# Patient Record
Sex: FEMALE | Race: WHITE | NOT HISPANIC OR LATINO | Employment: OTHER
[De-identification: names, ages, dates, MRNs, and addresses within clinical notes are randomized per-mention and may not be internally consistent; named-entity substitution may affect disease eponyms.]

---

## 2017-09-10 ENCOUNTER — HEALTH MAINTENANCE LETTER (OUTPATIENT)
Age: 62
End: 2017-09-10

## 2018-02-27 DIAGNOSIS — R06.09 DYSPNEA ON EXERTION: Primary | ICD-10-CM

## 2018-02-27 DIAGNOSIS — I27.20 PULMONARY HYPERTENSION (H): ICD-10-CM

## 2018-03-02 RX ORDER — SILDENAFIL CITRATE 20 MG/1
TABLET ORAL
Qty: 90 TABLET | Refills: 0 | Status: SHIPPED | OUTPATIENT
Start: 2018-03-02

## 2018-03-02 NOTE — TELEPHONE ENCOUNTER
A 30 day refill for Revatio (sildenafil) has been sent to Pharmacy.  No further refills will be sent until patient follows up with Dr. Adams.  Patient will also need to make an appointment to complete a 6 muinute walk test and Echocardiogram prior to appointment.

## 2018-04-26 ENCOUNTER — TELEPHONE (OUTPATIENT)
Dept: CARDIOLOGY | Facility: CLINIC | Age: 63
End: 2018-04-26

## 2018-04-26 NOTE — TELEPHONE ENCOUNTER
Contacted pt regarding need to schedule follow-up appointment with Dr. Adams as she was last seen in 07/2016. Pt states that she wishes to discontinue her care with Dr. Adams and will be now following with a local cardiologist in the [place]. Pt verbalized understanding that further refills will not be provided from Dr. Adams without follow-up.     [facility]   Pulmonary Hypertension  [facility]  (P) [phone]

## 2018-06-20 ENCOUNTER — TRANSFERRED RECORDS (OUTPATIENT)
Dept: HEALTH INFORMATION MANAGEMENT | Facility: CLINIC | Age: 63
End: 2018-06-20

## 2018-07-30 RX ORDER — CIPROFLOXACIN 2 MG/ML
400 INJECTION, SOLUTION INTRAVENOUS
Status: CANCELLED | OUTPATIENT
Start: 2018-08-03

## 2018-08-03 ENCOUNTER — SURGERY (OUTPATIENT)
Age: 63
End: 2018-08-03

## 2018-08-03 ENCOUNTER — ANESTHESIA (OUTPATIENT)
Dept: SURGERY | Facility: CLINIC | Age: 63
End: 2018-08-03
Payer: COMMERCIAL

## 2018-08-03 ENCOUNTER — APPOINTMENT (OUTPATIENT)
Dept: GENERAL RADIOLOGY | Facility: CLINIC | Age: 63
End: 2018-08-03
Attending: UROLOGY
Payer: COMMERCIAL

## 2018-08-03 ENCOUNTER — ANESTHESIA EVENT (OUTPATIENT)
Dept: SURGERY | Facility: CLINIC | Age: 63
End: 2018-08-03
Payer: COMMERCIAL

## 2018-08-03 ENCOUNTER — HOSPITAL ENCOUNTER (OUTPATIENT)
Facility: CLINIC | Age: 63
Discharge: HOME | End: 2018-08-03
Attending: UROLOGY | Admitting: UROLOGY
Payer: COMMERCIAL

## 2018-08-03 VITALS
WEIGHT: 293 LBS | TEMPERATURE: 98.1 F | BODY MASS INDEX: 45.99 KG/M2 | DIASTOLIC BLOOD PRESSURE: 84 MMHG | HEIGHT: 67 IN | RESPIRATION RATE: 20 BRPM | OXYGEN SATURATION: 94 % | SYSTOLIC BLOOD PRESSURE: 144 MMHG | HEART RATE: 75 BPM

## 2018-08-03 DIAGNOSIS — N20.0 KIDNEY STONE: Primary | ICD-10-CM

## 2018-08-03 LAB — GLUCOSE BLDC GLUCOMTR-MCNC: 186 MG/DL (ref 70–99)

## 2018-08-03 PROCEDURE — C1894 INTRO/SHEATH, NON-LASER: HCPCS | Performed by: UROLOGY

## 2018-08-03 PROCEDURE — 27210995 ZZH RX 272: Performed by: UROLOGY

## 2018-08-03 PROCEDURE — 71000027 ZZH RECOVERY PHASE 2 EACH 15 MINS: Performed by: UROLOGY

## 2018-08-03 PROCEDURE — 88300 SURGICAL PATH GROSS: CPT | Performed by: UROLOGY

## 2018-08-03 PROCEDURE — 36000056 ZZH SURGERY LEVEL 3 1ST 30 MIN: Performed by: UROLOGY

## 2018-08-03 PROCEDURE — 27210794 ZZH OR GENERAL SUPPLY STERILE: Performed by: UROLOGY

## 2018-08-03 PROCEDURE — 88300 SURGICAL PATH GROSS: CPT | Mod: 26 | Performed by: UROLOGY

## 2018-08-03 PROCEDURE — 82365 CALCULUS SPECTROSCOPY: CPT | Performed by: UROLOGY

## 2018-08-03 PROCEDURE — 25000132 ZZH RX MED GY IP 250 OP 250 PS 637: Performed by: NURSE ANESTHETIST, CERTIFIED REGISTERED

## 2018-08-03 PROCEDURE — 37000009 ZZH ANESTHESIA TECHNICAL FEE, EACH ADDTL 15 MIN: Performed by: UROLOGY

## 2018-08-03 PROCEDURE — 40000277 XR SURGERY CARM FLUORO LESS THAN 5 MIN W STILLS

## 2018-08-03 PROCEDURE — C1769 GUIDE WIRE: HCPCS | Performed by: UROLOGY

## 2018-08-03 PROCEDURE — 71000012 ZZH RECOVERY PHASE 1 LEVEL 1 FIRST HR: Performed by: UROLOGY

## 2018-08-03 PROCEDURE — 37000008 ZZH ANESTHESIA TECHNICAL FEE, 1ST 30 MIN: Performed by: UROLOGY

## 2018-08-03 PROCEDURE — 71000013 ZZH RECOVERY PHASE 1 LEVEL 1 EA ADDTL HR: Performed by: UROLOGY

## 2018-08-03 PROCEDURE — 25000125 ZZHC RX 250: Performed by: NURSE ANESTHETIST, CERTIFIED REGISTERED

## 2018-08-03 PROCEDURE — 25000128 H RX IP 250 OP 636: Performed by: NURSE ANESTHETIST, CERTIFIED REGISTERED

## 2018-08-03 PROCEDURE — 25000128 H RX IP 250 OP 636: Performed by: ANESTHESIOLOGY

## 2018-08-03 PROCEDURE — 82962 GLUCOSE BLOOD TEST: CPT

## 2018-08-03 PROCEDURE — 36000058 ZZH SURGERY LEVEL 3 EA 15 ADDTL MIN: Performed by: UROLOGY

## 2018-08-03 PROCEDURE — 40000170 ZZH STATISTIC PRE-PROCEDURE ASSESSMENT II: Performed by: UROLOGY

## 2018-08-03 PROCEDURE — 25000566 ZZH SEVOFLURANE, EA 15 MIN: Performed by: UROLOGY

## 2018-08-03 RX ORDER — EPHEDRINE SULFATE 50 MG/ML
INJECTION, SOLUTION INTRAMUSCULAR; INTRAVENOUS; SUBCUTANEOUS PRN
Status: DISCONTINUED | OUTPATIENT
Start: 2018-08-03 | End: 2018-08-03

## 2018-08-03 RX ORDER — SODIUM CHLORIDE, SODIUM LACTATE, POTASSIUM CHLORIDE, CALCIUM CHLORIDE 600; 310; 30; 20 MG/100ML; MG/100ML; MG/100ML; MG/100ML
INJECTION, SOLUTION INTRAVENOUS CONTINUOUS
Status: DISCONTINUED | OUTPATIENT
Start: 2018-08-03 | End: 2018-08-03 | Stop reason: HOSPADM

## 2018-08-03 RX ORDER — ALBUTEROL SULFATE 90 UG/1
AEROSOL, METERED RESPIRATORY (INHALATION) PRN
Status: DISCONTINUED | OUTPATIENT
Start: 2018-08-03 | End: 2018-08-03

## 2018-08-03 RX ORDER — HYDROMORPHONE HYDROCHLORIDE 1 MG/ML
.3-.5 INJECTION, SOLUTION INTRAMUSCULAR; INTRAVENOUS; SUBCUTANEOUS EVERY 10 MIN PRN
Status: DISCONTINUED | OUTPATIENT
Start: 2018-08-03 | End: 2018-08-03 | Stop reason: CLARIF

## 2018-08-03 RX ORDER — NALOXONE HYDROCHLORIDE 0.4 MG/ML
.1-.4 INJECTION, SOLUTION INTRAMUSCULAR; INTRAVENOUS; SUBCUTANEOUS
Status: DISCONTINUED | OUTPATIENT
Start: 2018-08-03 | End: 2018-08-03 | Stop reason: HOSPADM

## 2018-08-03 RX ORDER — MEPERIDINE HYDROCHLORIDE 25 MG/ML
12.5 INJECTION INTRAMUSCULAR; INTRAVENOUS; SUBCUTANEOUS
Status: DISCONTINUED | OUTPATIENT
Start: 2018-08-03 | End: 2018-08-03 | Stop reason: HOSPADM

## 2018-08-03 RX ORDER — FENTANYL CITRATE 50 UG/ML
25-50 INJECTION, SOLUTION INTRAMUSCULAR; INTRAVENOUS
Status: DISCONTINUED | OUTPATIENT
Start: 2018-08-03 | End: 2018-08-03

## 2018-08-03 RX ORDER — FENTANYL CITRATE 50 UG/ML
INJECTION, SOLUTION INTRAMUSCULAR; INTRAVENOUS PRN
Status: DISCONTINUED | OUTPATIENT
Start: 2018-08-03 | End: 2018-08-03

## 2018-08-03 RX ORDER — FENTANYL CITRATE 50 UG/ML
25 INJECTION, SOLUTION INTRAMUSCULAR; INTRAVENOUS
Status: DISCONTINUED | OUTPATIENT
Start: 2018-08-03 | End: 2018-08-03 | Stop reason: HOSPADM

## 2018-08-03 RX ORDER — ONDANSETRON 4 MG/1
4 TABLET, ORALLY DISINTEGRATING ORAL EVERY 30 MIN PRN
Status: DISCONTINUED | OUTPATIENT
Start: 2018-08-03 | End: 2018-08-03 | Stop reason: HOSPADM

## 2018-08-03 RX ORDER — HYDROCODONE BITARTRATE AND ACETAMINOPHEN 5; 325 MG/1; MG/1
1 TABLET ORAL EVERY 4 HOURS PRN
Qty: 10 TABLET | Refills: 0 | Status: SHIPPED | OUTPATIENT
Start: 2018-08-03

## 2018-08-03 RX ORDER — CIPROFLOXACIN 500 MG/1
500 TABLET, FILM COATED ORAL 2 TIMES DAILY
Qty: 5 TABLET | Refills: 0 | Status: SHIPPED | OUTPATIENT
Start: 2018-08-03 | End: 2018-08-06

## 2018-08-03 RX ORDER — PROPOFOL 10 MG/ML
INJECTION, EMULSION INTRAVENOUS PRN
Status: DISCONTINUED | OUTPATIENT
Start: 2018-08-03 | End: 2018-08-03

## 2018-08-03 RX ORDER — ONDANSETRON 2 MG/ML
4 INJECTION INTRAMUSCULAR; INTRAVENOUS EVERY 30 MIN PRN
Status: DISCONTINUED | OUTPATIENT
Start: 2018-08-03 | End: 2018-08-03 | Stop reason: HOSPADM

## 2018-08-03 RX ADMIN — PROPOFOL 150 MG: 10 INJECTION, EMULSION INTRAVENOUS at 14:29

## 2018-08-03 RX ADMIN — DEXMEDETOMIDINE HYDROCHLORIDE 12 MCG: 100 INJECTION, SOLUTION INTRAVENOUS at 14:21

## 2018-08-03 RX ADMIN — SUCCINYLCHOLINE CHLORIDE 160 MG: 20 INJECTION, SOLUTION INTRAMUSCULAR; INTRAVENOUS; PARENTERAL at 14:29

## 2018-08-03 RX ADMIN — Medication 5 MG: at 15:01

## 2018-08-03 RX ADMIN — SODIUM CHLORIDE, POTASSIUM CHLORIDE, SODIUM LACTATE AND CALCIUM CHLORIDE: 600; 310; 30; 20 INJECTION, SOLUTION INTRAVENOUS at 14:18

## 2018-08-03 RX ADMIN — DEXMEDETOMIDINE HYDROCHLORIDE 12 MCG: 100 INJECTION, SOLUTION INTRAVENOUS at 15:29

## 2018-08-03 RX ADMIN — WATER 3000 ML: 100 IRRIGANT IRRIGATION at 14:44

## 2018-08-03 RX ADMIN — Medication 5 MG: at 14:46

## 2018-08-03 RX ADMIN — ALBUTEROL SULFATE 6 PUFF: 90 AEROSOL, METERED RESPIRATORY (INHALATION) at 15:14

## 2018-08-03 RX ADMIN — FENTANYL CITRATE 25 MCG: 50 INJECTION, SOLUTION INTRAMUSCULAR; INTRAVENOUS at 14:29

## 2018-08-03 ASSESSMENT — ENCOUNTER SYMPTOMS: SEIZURES: 1

## 2018-08-03 ASSESSMENT — COPD QUESTIONNAIRES: COPD: 1

## 2018-08-03 NOTE — BRIEF OP NOTE
[facility] Brief Operative Note    Pre-operative diagnosis: LEFT NEPHROLITHIASIS   Post-operative diagnosis same    Procedure: Procedure(s):  CYSTOSCOPY/ LEFT URETEROSCOPY/ LEFT RETROGRADE/ HOLMIUM LASER LITHROTRIPSY/ STONE EXTRACTION/ LEFT STENT REMOVAL - Wound Class: II-Clean Contaminated   Surgeon: Aquilino Steinberg MD   Assistants(s): None    Estimated blood loss: None    Specimens: Stone fragments for stone analysis    Findings: Dilated left ureter,  Cm stone broken down to small fragments

## 2018-08-03 NOTE — ANESTHESIA CARE TRANSFER NOTE
Patient: Lona J Blandon    Procedure(s):  CYSTOSCOPY/ LEFT URETEROSCOPY/ LEFT RETROGRADE/ HOLMIUM LASER LITHROTRIPSY/ STONE EXTRACTION/ LEFT STENT REMOVAL - Wound Class: II-Clean Contaminated    Diagnosis: LEFT NEPHROLITHIASIS  Diagnosis Additional Information: No value filed.    Anesthesia Type:   General, ETT, RSI     Note:  Airway :Face Mask  Patient transferred to:PACU  Comments: Spont resps, VSS, following commands.  Extubated, sufficient air exchange.  To PACU, placed on bi-pap. VSS Report to RN.  Dr. Cao remaining at  to assess respiratory status.Handoff Report: Identifed the Patient, Identified the Reponsible Provider, Reviewed the pertinent medical history, Discussed the surgical course, Reviewed Intra-OP anesthesia mangement and issues during anesthesia, Set expectations for post-procedure period and Allowed opportunity for questions and acknowledgement of understanding      Vitals: (Last set prior to Anesthesia Care Transfer)    CRNA VITALS  8/3/2018 1501 - 8/3/2018 1541      8/3/2018             SpO2: 93 %    Resp Rate (observed): 13    Resp Rate (set): 10                Electronically Signed By: NURIS Jaramillo CRNA  August 3, 2018  3:41 PM

## 2018-08-03 NOTE — IP AVS SNAPSHOT
MRN:[id number]                      After Visit Summary   8/3/2018    Lona Blandon    MRN: [id number]           Thank you!     Thank you for choosing [facility] for your care. Our goal is always to provide you with excellent care. Hearing back from our patients is one way we can continue to improve our services. Please take a few minutes to complete the written survey that you may receive in the mail after you visit with us. Thank you!        Patient Information     Date Of Birth          1955        About your hospital stay     You were admitted on:  August 3, 2018 You last received care in the:  [facility] PACU    You were discharged on:  August 3, 2018        Reason for your hospital stay       Left kidney stone                  Who to Call     For medical emergencies, please call 911.  For non-urgent questions about your medical care, please call your primary care provider or clinic, [phone]  For questions related to your surgery, please call your surgery clinic        Attending Provider     Provider Aquilino Farias MD Urology       Primary Care Provider Office Phone # Fax #    Delfino You Sandy [phone]      Follow-up Appointments     Follow-up and recommended labs and tests        F/u as needed                  Further instructions from your care team         **Because you had anesthesia today and your history of sleep apnea, it is extremely important that you use your CPAP machine for the next 24 hours while napping or sleeping.**      Same Day Surgery Discharge Instructions for  Sedation and General Anesthesia       It's not unusual to feel dizzy, light-headed or faint for up to 24 hours after surgery or while taking pain medication.  If you have these symptoms: sit for a few minutes before standing and have someone assist you when you get up to walk or use the bathroom.      You should rest and relax for the next 24 hours. We  recommend you make arrangements to have an adult stay with you for at least 24 hours after your discharge.  Avoid hazardous and strenuous activity.      DO NOT DRIVE any vehicle or operate mechanical equipment for 24 hours following the end of your surgery.  Even though you may feel normal, your reactions may be affected by the medication you have received.      Do not drink alcoholic beverages for 24 hours following surgery.       Slowly progress to your regular diet as you feel able. It's not unusual to feel nauseated and/or vomit after receiving anesthesia.  If you develop these symptoms, drink clear liquids (apple juice, ginger ale, broth, 7-up, etc. ) until you feel better.  If your nausea and vomiting persists for 24 hours, please notify your surgeon.        All narcotic pain medications, along with inactivity and anesthesia, can cause constipation. Drinking plenty of liquids and increasing fiber intake will help.      For any questions of a medical nature, call your surgeon.      Do not make important decisions for 24 hours.      If you had general anesthesia, you may have a sore throat for a couple of days related to the breathing tube used during surgery.  You may use Cepacol lozenges to help with this discomfort.  If it worsens or if you develop a fever, contact your surgeon.       If you feel your pain is not well managed with the pain medications prescribed by your surgeon, please contact your surgeon's office to let them know so they can address your concerns.           Cystoscopy and  Holmium Laser Discharge Instructions    Holmium laser lithotripsy was used to break up your kidney stone(s). It is normal to have visible blood in the urine, burning, urgency and frequency following this procedure. These symptoms may last for a few days to weeks.     Diet:    To help pass stone fragments and clear the blood out of the urine, it is important to drink 6-8 glasses of fluids per day at home - at least 3-4  "glasses should be water.       Return to the diet that you were on before the procedure, unless you are given specific diet instructions.    Activity:    Walk short distances and increase as your strength allows.    You may climb stairs.    Do not do strenuous exercise or heavy lifting until approved by surgeon.    Do not drive while taking narcotic pain medications.    Bathing:    You may take a shower.           Call your physician if these signs/symptoms are present:    Pain that is not relieved by a short rest or ordered pain medications.    Temperature at or above 101.0 F or chills.    Inability or difficulty urinating.     Excessive blood in urine.    Any questions or concerns.      **If you have questions or concerns about your procedure,   call Dr. Steinberg at [phone]**          Pending Results     Date and Time Order Name Status Description    8/3/2018 1553 Surgical pathology exam In process     8/3/2018 1530 Stone analysis In process             Admission Information     Date & Time Provider Department Dept. Phone    8/3/2018 Aquilino Stienberg MD [facility] PACU [phone]      Your Vitals Were     Blood Pressure Pulse Temperature Respirations Height Weight    144/84 75 98.1  F (36.7  C) 17 1.702 m (5' 7\") 140.5 kg (309 lb 11.2 oz)    Pulse Oximetry BMI (Body Mass Index)                92% 48.51 kg/m2          MyChart Information     [employer] gives you secure access to your electronic health record. If you see a primary care provider, you can also send messages to your care team and make appointments. If you have questions, please call your primary care clinic.  If you do not have a primary care provider, please call [phone] and they will assist you.        Care EveryWhere ID     This is your Care EveryWhere ID. This could be used by other organizations to access your [facility] medical records  [id number]        Equal Access to Services     ALEAH MAYES: Sheri saleh " Soomaali, waaxda luqadaha, qaybta kaalmada adekiara, henry sainikym amy. So [facility] [phone].    ATENCIÓN: Si dulce maria paul, tiene a valdes disposición servicios gratuitos de asistencia lingüística. Angeli al [phone].    We comply with applicable federal civil rights laws and [place] laws. We do not discriminate on the basis of race, color, national origin, age, disability, sex, sexual orientation, or gender identity.               Review of your medicines      START taking        Dose / Directions    ciprofloxacin 500 MG tablet   Commonly known as:  CIPRO   Used for:  Kidney stone        Dose:  500 mg   Take 1 tablet (500 mg) by mouth 2 times daily for 5 doses   Quantity:  5 tablet   Refills:  0       HYDROcodone-acetaminophen 5-325 MG per tablet   Commonly known as:  NORCO   Used for:  Kidney stone        Dose:  1 tablet   Take 1 tablet by mouth every 4 hours as needed for pain   Quantity:  10 tablet   Refills:  0         CONTINUE these medicines which have NOT CHANGED        Dose / Directions    ALEVE 220 MG tablet   Generic drug:  naproxen sodium        Take 2 daily   Refills:  0       ascorbic acid 500 MG tablet   Commonly known as:  VITAMIN C        Dose:  500 mg   Take 500 mg by mouth daily.   Refills:  0       aspirin 81 MG tablet        Dose:  1 tablet   Take 1 tablet by mouth daily.   Refills:  0       cholecalciferol 400 UNIT Tabs tablet   Commonly known as:  vitamin D3        Dose:  400 Units   Take 400 Units by mouth daily.   Refills:  0       cinnamon 500 MG Tabs        Dose:  1 tablet   Take 1 tablet by mouth daily.   Refills:  0       * COUMADIN PO        Dose:  7.5 tablet   Take 7.5 tablets by mouth. Wed,sat.   Refills:  0       * COUMADIN 5 MG tablet   Generic drug:  warfarin        Dose:  1 tablet   Take 1 tablet by mouth. MTTHFSU.   Refills:  0       CRESTOR 5 MG tablet   Generic drug:  rosuvastatin        Take by mouth every 7 days   Refills:  0       DAILY  MULTIVITAMIN PO        Take 1 daily   Refills:  0       ELOCON 0.1 % cream   Generic drug:  mometasone        Dose:  0.5 inch   Apply 0.5 inches topically as needed.   Refills:  0       ferrous sulfate 325 (65 Fe) MG tablet   Commonly known as:  IRON        Dose:  1 tablet   Take 1 tablet by mouth daily (with breakfast)   Refills:  0       FLONASE NA        Dose:  2 spray   Spray 2 sprays in nostril daily.   Refills:  0       furosemide 40 MG tablet   Commonly known as:  LASIX        Dose:  40 mg   Take 40 mg by mouth daily.   Refills:  0       glipiZIDE 2.5 mg Tabs half-tab   Commonly known as:  GLUCOTROL        Dose:  2.5 mg   Take 2.5 mg by mouth every morning (before breakfast)   Refills:  0       insulin  UNIT/ML injection   Commonly known as:  HumuLIN N/NovoLIN N        Dose:  16 Units   Inject 16 Units Subcutaneous 2 times daily   Refills:  0       ketoconazole 2 % cream   Commonly known as:  NIZORAL        Dose:  0.5 inch   Apply 0.5 inches topically 2 times daily as needed.   Refills:  0       KLOR-CON PO        Take  by mouth. 20 meq 1 tablet daily   Refills:  0       losartan 25 MG tablet   Commonly known as:  COZAAR        Dose:  25 mg   Take 25 mg by mouth daily   Refills:  0       metFORMIN 500 MG tablet   Commonly known as:  GLUCOPHAGE        Dose:  500 mg   Take 500 mg by mouth daily (with breakfast)   Refills:  0       metoprolol tartrate 25 MG tablet   Commonly known as:  LOPRESSOR        Dose:  25 mg   Take 25 mg by mouth Takes 1/2 tablet daily per patient   Refills:  0       PREDNISONE PO        Dose:  2.5 mg   Take 2.5 mg by mouth   Refills:  0       PROAIR  (90 Base) MCG/ACT Inhaler   Generic drug:  albuterol        Dose:  2 puff   Inhale 2 puffs into the lungs every 4 hours as needed.   Refills:  0       sildenafil 20 MG tablet   Commonly known as:  REVATIO   Used for:  Pulmonary hypertension, Dyspnea on exertion        TAKE ONE TABLET BY MOUTH THREE TIMES DAILY FOR  PULMONARY   HYPERTENSION.  NEVER  USE  WITH  NITROGLYCERIN,  TERAZOSIN  OR  DOXAZOSIN   Quantity:  90 tablet   Refills:  0       ULORIC 80 MG Tabs tablet   Generic drug:  febuxostat        Dose:  80 mg   Take 80 mg by mouth daily.   Refills:  0       ZYRTEC ALLERGY 10 MG tablet   Generic drug:  cetirizine        Dose:  10 mg   Take 10 mg by mouth daily.   Refills:  0       * Notice:  This list has 2 medication(s) that are the same as other medications prescribed for you. Read the directions carefully, and ask your doctor or other care provider to review them with you.         Where to get your medicines      These medications were sent to [facility] [place], [place] - [address]  [address], [place] [address]     Phone:  [phone]     ciprofloxacin 500 MG tablet         Some of these will need a paper prescription and others can be bought over the counter. Ask your nurse if you have questions.     Bring a paper prescription for each of these medications     HYDROcodone-acetaminophen 5-325 MG per tablet                Protect others around you: Learn how to safely use, store and throw away your medicines at www.disposemymeds.org.        ANTIBIOTIC INSTRUCTION     You've Been Prescribed an Antibiotic - Now What?  Your healthcare team thinks that you or your loved one might have an infection. Some infections can be treated with antibiotics, which are powerful, life-saving drugs. Like all medications, antibiotics have side effects and should only be used when necessary. There are some important things you should know about your antibiotic treatment.      Your healthcare team may run tests before you start taking an antibiotic.    Your team may take samples (e.g., from your blood, urine or other areas) to run tests to look for bacteria. These test can be important to determine if you need an antibiotic at all and, if you do, which antibiotic will work best.      Within a few days, your healthcare  team might change or even stop your antibiotic.    Your team may start you on an antibiotic while they are working to find out what is making you sick.    Your team might change your antibiotic because test results show that a different antibiotic would be better to treat your infection.    In some cases, once your team has more information, they learn that you do not need an antibiotic at all. They may find out that you don't have an infection, or that the antibiotic you're taking won't work against your infection. For example, an infection caused by a virus can't be treated with antibiotics. Staying on an antibiotic when you don't need it is more likely to be harmful than helpful.      You may experience side effects from your antibiotic.    Like all medications, antibiotics have side effects. Some of these can be serious.    Let you healthcare team know if you have any known allergies when you are admitted to the hospital.    One significant side effect of nearly all antibiotics is the risk of severe and sometimes deadly diarrhea caused by Clostridium difficile (C. Difficile). This occurs when a person takes antibiotics because some good germs are destroyed. Antibiotic use allows C. diificile to take over, putting patients at high risk for this serious infection.    As a patient or caregiver, it is important to understand your or your loved one's antibiotic treatment. It is especially important for caregivers to speak up when patients can't speak for themselves. Here are some important questions to ask your healthcare team.    What infection is this antibiotic treating and how do you know I have that infection?    What side effects might occur from this antibiotic?    How long will I need to take this antibiotic?    Is it safe to take this antibiotic with other medications or supplements (e.g., vitamins) that I am taking?     Are there any special directions I need to know about taking this antibiotic? For  example, should I take it with food?    How will I be monitored to know whether my infection is responding to the antibiotic?    What tests may help to make sure the right antibiotic is prescribed for me?      Information provided by:  www.cdc.gov/getsmart  U.S. Department of Health and Human Services  Centers for disease Control and Prevention  National Center for Emerging and Zoonotic Infectious Diseases  Division of Healthcare Quality Promotion        Information about OPIOIDS     PRESCRIPTION OPIOIDS: WHAT YOU NEED TO KNOW   We gave you an opioid (narcotic) pain medicine. It is important to manage your pain, but opioids are not always the best choice. You should first try all the other options your care team gave you. Take this medicine for as short a time (and as few doses) as possible.     These medicines have risks:    DO NOT drive when on new or higher doses of pain medicine. These medicines can affect your alertness and reaction times, and you could be arrested for driving under the influence (DUI). If you need to use opioids long-term, talk to your care team about driving.    DO NOT operate heave machinery    DO NOT do any other dangerous activities while taking these medicines.     DO NOT drink any alcohol while taking these medicines.      If the opioid prescribed includes acetaminophen, DO NOT take with any other medicines that contain acetaminophen. Read all labels carefully. Look for the word  acetaminophen  or  Tylenol.  Ask your pharmacist if you have questions or are unsure.    You can get addicted to pain medicines, especially if you have a history of addiction (chemical, alcohol or substance dependence). Talk to your care team about ways to reduce this risk.    Store your pills in a secure place, locked if possible. We will not replace any lost or stolen medicine. If you don t finish your medicine, please throw away (dispose) as directed by your pharmacist. The [place] Pollution Control Agency  has more information about safe disposal: https://www.pca.[facility].[place].us/living-green/managing-unwanted-medications.     All opioids tend to cause constipation. Drink plenty of water and eat foods that have a lot of fiber, such as fruits, vegetables, prune juice, apple juice and high-fiber cereal. Take a laxative (Miralax, milk of magnesia, Colace, Senna) if you don t move your bowels at least every other day.              Medication List: This is a list of all your medications and when to take them. Check marks below indicate your daily home schedule. Keep this list as a reference.      Medications           Morning Afternoon Evening Bedtime As Needed    ALEVE 220 MG tablet   Take 2 daily   Generic drug:  naproxen sodium                                ascorbic acid 500 MG tablet   Commonly known as:  VITAMIN C   Take 500 mg by mouth daily.                                aspirin 81 MG tablet   Take 1 tablet by mouth daily.                                cholecalciferol 400 UNIT Tabs tablet   Commonly known as:  vitamin D3   Take 400 Units by mouth daily.                                cinnamon 500 MG Tabs   Take 1 tablet by mouth daily.                                ciprofloxacin 500 MG tablet   Commonly known as:  CIPRO   Take 1 tablet (500 mg) by mouth 2 times daily for 5 doses                                * COUMADIN PO   Take 7.5 tablets by mouth. Wed,sat.                                * COUMADIN 5 MG tablet   Take 1 tablet by mouth. MTTHFSU.   Generic drug:  warfarin                                CRESTOR 5 MG tablet   Take by mouth every 7 days   Generic drug:  rosuvastatin                                DAILY MULTIVITAMIN PO   Take 1 daily                                ELOCON 0.1 % cream   Apply 0.5 inches topically as needed.   Generic drug:  mometasone                                ferrous sulfate 325 (65 Fe) MG tablet   Commonly known as:  IRON   Take 1 tablet by mouth daily (with breakfast)                                 FLONASE NA   Spray 2 sprays in nostril daily.                                furosemide 40 MG tablet   Commonly known as:  LASIX   Take 40 mg by mouth daily.                                glipiZIDE 2.5 mg Tabs half-tab   Commonly known as:  GLUCOTROL   Take 2.5 mg by mouth every morning (before breakfast)                                HYDROcodone-acetaminophen 5-325 MG per tablet   Commonly known as:  NORCO   Take 1 tablet by mouth every 4 hours as needed for pain                                insulin  UNIT/ML injection   Commonly known as:  HumuLIN N/NovoLIN N   Inject 16 Units Subcutaneous 2 times daily                                ketoconazole 2 % cream   Commonly known as:  NIZORAL   Apply 0.5 inches topically 2 times daily as needed.                                KLOR-CON PO   Take  by mouth. 20 meq 1 tablet daily                                losartan 25 MG tablet   Commonly known as:  COZAAR   Take 25 mg by mouth daily                                metFORMIN 500 MG tablet   Commonly known as:  GLUCOPHAGE   Take 500 mg by mouth daily (with breakfast)                                metoprolol tartrate 25 MG tablet   Commonly known as:  LOPRESSOR   Take 25 mg by mouth Takes 1/2 tablet daily per patient                                PREDNISONE PO   Take 2.5 mg by mouth                                PROAIR  (90 Base) MCG/ACT Inhaler   Inhale 2 puffs into the lungs every 4 hours as needed.   Last time this was given:  6 puffs on 8/3/2018  3:14 PM   Generic drug:  albuterol                                sildenafil 20 MG tablet   Commonly known as:  REVATIO   TAKE ONE TABLET BY MOUTH THREE TIMES DAILY FOR  PULMONARY  HYPERTENSION.  NEVER  USE  WITH  NITROGLYCERIN,  TERAZOSIN  OR  DOXAZOSIN                                ULORIC 80 MG Tabs tablet   Take 80 mg by mouth daily.   Generic drug:  febuxostat                                ZYRTEC ALLERGY 10 MG tablet   Take 10 mg by  mouth daily.   Generic drug:  cetirizine                                * Notice:  This list has 2 medication(s) that are the same as other medications prescribed for you. Read the directions carefully, and ask your doctor or other care provider to review them with you.

## 2018-08-03 NOTE — OP NOTE
Procedure Date: 08/03/2018      DATE OF SURGERY:  08/03/2018      PREOPERATIVE DIAGNOSIS:  Left kidney stone, 1 cm.      POSTOPERATIVE DIAGNOSIS:  Left kidney stone, 1 cm.      PROCEDURE:  Left ureteroscopy with holmium laser lithotripsy, extraction of the stone fragment, stent removal.      SURGEON:  Dr. Steinberg,      ANESTHESIA:  General.  Preoperatively she received antibiotics.      INDICATIONS FOR PROCEDURE:  Lona is a 62-year-old morbidly obese, status post kidney hematoma who also had a 1 cm UPJ stone pushed to the kidney, presents for definitive treatment.  I consented her for the procedure with the risks of bleeding, infection, injury, recurrence of the stone formation, need for additional surgery.  She would like to proceed.      DETAILS OF THE PROCEDURE:  Lona was brought to the operating room, placed in supine position.  After excellent induction of general anesthesia, her perineum was prepped and draped in the regular fashion.  A 22-Japanese cystoscope was placed per urethra.  To my surprise, her stent was not identified in the bladder.  It appears that the stent was slipped into the left ureter.  I performed ureteroscopy, grasped the stent using a stone basket and removed it.  The Glidewire x2 was placed.  I performed flexible ureteroscopy all the way to the left renal pelvis over a wire.  I evaluation of the calyces did demonstrate 1 cm stone in the renal pelvis.  I used 200 micron holmium laser fiber to break down the stone into multiple fragments.  One of the small fragments was grasped and removed for stone analysis.      Initially I placed a stent 30 x 6 double-J; however, given the patient's significant morbidity and tortuosity of the ureter.  The stent appears to be small and keeps slipping into the distal portion of the left ureter.  Given the fact that also her ureter was quite dilated and did not have any resistance with my flexible ureteroscopy, I decided to remove the stent and  encourage patient's spontaneous passage of the stone fragments.      PLAN:  As of now, follow up with me as needed.      FINDINGS:  No stent placed at the completion of the procedure.         YULISA GOLDBERG MD             D: 2018   T: 2018   MT: SANDIP      Name:     JACK HILTON   MRN:      [id number]        Account:        [id number]   :      1955           Procedure Date: 2018      Document: [id number]

## 2018-08-03 NOTE — DISCHARGE INSTRUCTIONS
**Because you had anesthesia today and your history of sleep apnea, it is extremely important that you use your CPAP machine for the next 24 hours while napping or sleeping.**      Same Day Surgery Discharge Instructions for  Sedation and General Anesthesia       It's not unusual to feel dizzy, light-headed or faint for up to 24 hours after surgery or while taking pain medication.  If you have these symptoms: sit for a few minutes before standing and have someone assist you when you get up to walk or use the bathroom.      You should rest and relax for the next 24 hours. We recommend you make arrangements to have an adult stay with you for at least 24 hours after your discharge.  Avoid hazardous and strenuous activity.      DO NOT DRIVE any vehicle or operate mechanical equipment for 24 hours following the end of your surgery.  Even though you may feel normal, your reactions may be affected by the medication you have received.      Do not drink alcoholic beverages for 24 hours following surgery.       Slowly progress to your regular diet as you feel able. It's not unusual to feel nauseated and/or vomit after receiving anesthesia.  If you develop these symptoms, drink clear liquids (apple juice, ginger ale, broth, 7-up, etc. ) until you feel better.  If your nausea and vomiting persists for 24 hours, please notify your surgeon.        All narcotic pain medications, along with inactivity and anesthesia, can cause constipation. Drinking plenty of liquids and increasing fiber intake will help.      For any questions of a medical nature, call your surgeon.      Do not make important decisions for 24 hours.      If you had general anesthesia, you may have a sore throat for a couple of days related to the breathing tube used during surgery.  You may use Cepacol lozenges to help with this discomfort.  If it worsens or if you develop a fever, contact your surgeon.       If you feel your pain is not well managed with the  pain medications prescribed by your surgeon, please contact your surgeon's office to let them know so they can address your concerns.           Cystoscopy and  Holmium Laser Discharge Instructions    Holmium laser lithotripsy was used to break up your kidney stone(s). It is normal to have visible blood in the urine, burning, urgency and frequency following this procedure. These symptoms may last for a few days to weeks.     Diet:    To help pass stone fragments and clear the blood out of the urine, it is important to drink 6-8 glasses of fluids per day at home - at least 3-4 glasses should be water.       Return to the diet that you were on before the procedure, unless you are given specific diet instructions.    Activity:    Walk short distances and increase as your strength allows.    You may climb stairs.    Do not do strenuous exercise or heavy lifting until approved by surgeon.    Do not drive while taking narcotic pain medications.    Bathing:    You may take a shower.           Call your physician if these signs/symptoms are present:    Pain that is not relieved by a short rest or ordered pain medications.    Temperature at or above 101.0 F or chills.    Inability or difficulty urinating.     Excessive blood in urine.    Any questions or concerns.      **If you have questions or concerns about your procedure,   call Dr. Steinberg at [phone]**

## 2018-08-03 NOTE — IP AVS SNAPSHOT
[place] [address]    [place] [address]    Phone:  [phone]                                       After Visit Summary   8/3/2018    Lona Blandon    MRN: [id number]           After Visit Summary Signature Page     I have received my discharge instructions, and my questions have been answered. I have discussed any challenges I see with this plan with the nurse or doctor.    ..........................................................................................................................................  Patient/Patient Representative Signature      ..........................................................................................................................................  Patient Representative Print Name and Relationship to Patient    ..................................................               ................................................  Date                                            Time    ..........................................................................................................................................  Reviewed by Signature/Title    ...................................................              ..............................................  Date                                                            Time

## 2018-08-03 NOTE — ANESTHESIA PREPROCEDURE EVALUATION
Anesthesia Evaluation     . Pt has had prior anesthetic.     History of anesthetic complications   - difficult intubation  slow wake up, 2 person mask, ok intubation with glidescope      ROS/MED HX    ENT/Pulmonary: Comment: Bipap  Oxygen at night    (+)sleep apnea, COPD, uses CPAP , . .    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 : . . . :. . pulmonary hypertension,       METS/Exercise Tolerance:     Hematologic: Comments: IVC filter    (+) History of blood clots pt is not anticoagulated, Anemia, -      Musculoskeletal:         GI/Hepatic:        (-) GERD   Renal/Genitourinary: Comment: Perinephric hematoma    (+) chronic renal disease, type: CRI, Nephrolithiasis ,       Endo:     (+) type II DM Using insulin - not using insulin pump Obesity, .      Psychiatric:         Infectious Disease:         Malignancy:         Other:                     Physical Exam  Normal systems: dental    Airway   Mallampati: III  TM distance: >3 FB  Neck ROM: full    Dental     Cardiovascular   Rhythm and rate: regular and normal      Pulmonary    breath sounds clear to auscultation                    Anesthesia Plan      History & Physical Review  History and physical reviewed and following examination; no interval change.    ASA Status:  4 .    NPO Status:  > 8 hours    Plan for General, ETT and RSI with Intravenous induction. Maintenance will be Balanced.      Additional equipment: Videolaryngoscope NO LIDOCAINE  NO VERSED  precedex  NO ZOFRAN      Postoperative Care      Consents  Anesthetic plan, risks, benefits and alternatives discussed with:  Patient..                          .

## 2018-08-04 NOTE — ANESTHESIA POSTPROCEDURE EVALUATION
Patient: Lona J Blandon    Procedure(s):  CYSTOSCOPY/ LEFT URETEROSCOPY/ LEFT RETROGRADE/ HOLMIUM LASER LITHROTRIPSY/ STONE EXTRACTION/ LEFT STENT REMOVAL - Wound Class: II-Clean Contaminated    Diagnosis:LEFT NEPHROLITHIASIS  Diagnosis Additional Information: No value filed.    Anesthesia Type:  General, ETT, RSI    Note:  Anesthesia Post Evaluation    Patient location during evaluation: PACU  Patient participation: Able to fully participate in evaluation  Level of consciousness: awake and alert  Pain management: adequate  Airway patency: patent  Cardiovascular status: acceptable  Respiratory status: acceptable  Hydration status: acceptable  PONV: none and controlled     Anesthetic complications: None          Last vitals:  Vitals:    08/03/18 1700 08/03/18 1715 08/03/18 1730   BP:      Pulse:      Resp: 17  20   Temp:      SpO2: 92% 92% 94%         Electronically Signed By: Luis A Leslie MD  August 3, 2018  7:52 PM

## 2018-08-06 LAB — COPATH REPORT: NORMAL

## 2018-08-10 LAB
APPEARANCE STONE: NORMAL
COMPN STONE: NORMAL
NUMBER STONE: 1
SIZE STONE: NORMAL MM
WT STONE: 5 MG

## 2019-11-08 ENCOUNTER — HEALTH MAINTENANCE LETTER (OUTPATIENT)
Age: 64
End: 2019-11-08

## 2020-02-23 ENCOUNTER — HEALTH MAINTENANCE LETTER (OUTPATIENT)
Age: 65
End: 2020-02-23

## 2020-12-06 ENCOUNTER — HEALTH MAINTENANCE LETTER (OUTPATIENT)
Age: 65
End: 2020-12-06

## 2021-04-11 ENCOUNTER — HEALTH MAINTENANCE LETTER (OUTPATIENT)
Age: 66
End: 2021-04-11

## 2021-09-25 ENCOUNTER — HEALTH MAINTENANCE LETTER (OUTPATIENT)
Age: 66
End: 2021-09-25

## 2021-11-20 ENCOUNTER — HEALTH MAINTENANCE LETTER (OUTPATIENT)
Age: 66
End: 2021-11-20

## 2022-05-07 ENCOUNTER — HEALTH MAINTENANCE LETTER (OUTPATIENT)
Age: 67
End: 2022-05-07

## 2023-01-07 ENCOUNTER — HEALTH MAINTENANCE LETTER (OUTPATIENT)
Age: 68
End: 2023-01-07

## 2023-06-02 ENCOUNTER — HEALTH MAINTENANCE LETTER (OUTPATIENT)
Age: 68
End: 2023-06-02

## 2023-12-02 ENCOUNTER — HEALTH MAINTENANCE LETTER (OUTPATIENT)
Age: 68
End: 2023-12-02

## (undated) DEVICE — PACK CYSTOSCOPY SBA15CYFSI

## (undated) DEVICE — LINEN TOWEL PACK X5 5464

## (undated) DEVICE — Device

## (undated) DEVICE — GLOVE PROTEXIS W/NEU-THERA 6.5  2D73TE65

## (undated) DEVICE — PAD CHUX UNDERPAD 23X24" 7136

## (undated) DEVICE — SOL WATER IRRIG 1000ML BOTTLE 2F7114

## (undated) DEVICE — GUIDEWIRE ZIPWIRE STR STIFF .035"X150CM M006630222B0

## (undated) DEVICE — SOL WATER IRRIG 3000ML BAG 2B7117

## (undated) DEVICE — SHEATH URETERAL ACCESS NAVIGATOR HD 11/13FRX46CM M0062502230

## (undated) DEVICE — BAG CYSTO TABLE DRAIN

## (undated) DEVICE — LASER FIBER HOLMIUM FLEXIVA 200UM M0068403910 840-391

## (undated) DEVICE — BASKET STONE RETRIEVAL SEGURA 3FRX120CM M0063801100

## (undated) DEVICE — ENDO ADAPTER CHECK-FLO DISP 27550-CKU

## (undated) RX ORDER — CIPROFLOXACIN 2 MG/ML
INJECTION, SOLUTION INTRAVENOUS
Status: DISPENSED
Start: 2018-08-03

## (undated) RX ORDER — PROPOFOL 10 MG/ML
INJECTION, EMULSION INTRAVENOUS
Status: DISPENSED
Start: 2018-08-03

## (undated) RX ORDER — ALBUTEROL SULFATE 90 UG/1
AEROSOL, METERED RESPIRATORY (INHALATION)
Status: DISPENSED
Start: 2018-08-03

## (undated) RX ORDER — FENTANYL CITRATE 50 UG/ML
INJECTION, SOLUTION INTRAMUSCULAR; INTRAVENOUS
Status: DISPENSED
Start: 2018-08-03